# Patient Record
Sex: MALE | URBAN - METROPOLITAN AREA
[De-identification: names, ages, dates, MRNs, and addresses within clinical notes are randomized per-mention and may not be internally consistent; named-entity substitution may affect disease eponyms.]

---

## 2020-07-14 ENCOUNTER — APPOINTMENT (RX ONLY)
Dept: URBAN - METROPOLITAN AREA CLINIC 133 | Facility: CLINIC | Age: 30
Setting detail: DERMATOLOGY
End: 2020-07-14

## 2020-07-14 VITALS — TEMPERATURE: 98.2 F

## 2020-07-14 DIAGNOSIS — L72.0 EPIDERMAL CYST: ICD-10-CM

## 2020-07-14 DIAGNOSIS — L72.8 OTHER FOLLICULAR CYSTS OF THE SKIN AND SUBCUTANEOUS TISSUE: ICD-10-CM

## 2020-07-14 PROCEDURE — ? INCISION AND DRAINAGE

## 2020-07-14 PROCEDURE — 10060 I&D ABSCESS SIMPLE/SINGLE: CPT

## 2020-07-14 PROCEDURE — 99201: CPT | Mod: 25

## 2020-07-14 PROCEDURE — ? OBSERVATION

## 2020-07-14 PROCEDURE — ? OTHER

## 2020-07-14 ASSESSMENT — LOCATION SIMPLE DESCRIPTION DERM
LOCATION SIMPLE: PENIS
LOCATION SIMPLE: SCROTUM

## 2020-07-14 ASSESSMENT — LOCATION DETAILED DESCRIPTION DERM
LOCATION DETAILED: RIGHT SCROTUM
LOCATION DETAILED: DORSAL PENILE SHAFT

## 2020-07-14 ASSESSMENT — LOCATION ZONE DERM
LOCATION ZONE: GENITALIA
LOCATION ZONE: PENIS

## 2020-07-14 NOTE — HPI: SECONDARY COMPLAINT
How Severe Is This Condition?: moderate
Additional History: not spreading, no hx of HPV, no known sexual partner with hpv

## 2020-07-14 NOTE — HPI: EVALUATION OF SKIN LESION(S)
What Type Of Note Output Would You Prefer (Optional)?: Bullet Format
Hpi Title: Evaluation of Skin Lesions
How Severe Are Your Spot(S)?: moderate
Have Your Spot(S) Been Treated In The Past?: has not been treated
Additional History: Patient is getting  in October and would like these spots evaluated

## 2020-07-14 NOTE — PROCEDURE: OTHER
Note Text (......Xxx Chief Complaint.): This diagnosis correlates with the
Other (Free Text): Lesion was touched with electric needle
Detail Level: Zone

## 2020-07-14 NOTE — PROCEDURE: INCISION AND DRAINAGE
Detail Level: Detailed
Drainage Type?: bloody and cyst-like
Curette: No
Lesion Type: Cyst
Render Postcare In Note?: Yes
Consent was obtained and risks were reviewed including but not limited to delayed wound healing, infection, need for multiple I and D's, and pain.
Hemostasis: Drysol
Epidermal Closure: simple interrupted
Drainage Amount?: minimal
Curette Text (Optional): After the contents were expressed a curette was used to partially remove the cyst wall.
Anesthesia Type: 1% lidocaine with epinephrine
Method: 15 blade
Preparation Text: The area was prepped in the usual clean fashion.
Size Of Lesion In Cm (Optional But May Be Required For Some Insurances): 0
Epidermal Sutures: 4-0 Ethilon
Suture Text: The incision was partially closed with
Post-Care Instructions: I reviewed with the patient in detail post-care instructions. Patient should keep wound covered and call the office should any redness, pain, swelling or worsening occur.
Dressing: no dressing

## 2020-07-14 NOTE — PROCEDURE: MIPS QUALITY
Detail Level: Detailed
Quality 265: Biopsy Follow-Up: Biopsy results reviewed, communicated, tracked, and documented
Quality 130: Documentation Of Current Medications In The Medical Record: Current Medications Documented
Quality 431: Preventive Care And Screening: Unhealthy Alcohol Use - Screening: Patient screened for unhealthy alcohol use using a single question and scores less than 2 times per year
Quality 226: Preventive Care And Screening: Tobacco Use: Screening And Cessation Intervention: Patient screened for tobacco use and is an ex/non-smoker